# Patient Record
Sex: MALE | Race: WHITE | NOT HISPANIC OR LATINO | URBAN - METROPOLITAN AREA
[De-identification: names, ages, dates, MRNs, and addresses within clinical notes are randomized per-mention and may not be internally consistent; named-entity substitution may affect disease eponyms.]

---

## 2024-06-26 ENCOUNTER — HOSPITAL ENCOUNTER (OUTPATIENT)
Dept: RADIOLOGY | Facility: CLINIC | Age: 39
Discharge: HOME | End: 2024-06-26
Payer: COMMERCIAL

## 2024-06-26 ENCOUNTER — OFFICE VISIT (OUTPATIENT)
Dept: ORTHOPEDIC SURGERY | Facility: CLINIC | Age: 39
End: 2024-06-26
Payer: COMMERCIAL

## 2024-06-26 DIAGNOSIS — M79.672 LEFT FOOT PAIN: ICD-10-CM

## 2024-06-26 DIAGNOSIS — M76.829 POSTERIOR TIBIAL TENDON DYSFUNCTION: ICD-10-CM

## 2024-06-26 DIAGNOSIS — M72.2 PLANTAR FASCIITIS OF LEFT FOOT: Primary | ICD-10-CM

## 2024-06-26 PROCEDURE — 73630 X-RAY EXAM OF FOOT: CPT | Mod: LT

## 2024-06-26 PROCEDURE — 99204 OFFICE O/P NEW MOD 45 MIN: CPT | Performed by: PHYSICIAN ASSISTANT

## 2024-06-26 PROCEDURE — 99214 OFFICE O/P EST MOD 30 MIN: CPT | Performed by: PHYSICIAN ASSISTANT

## 2024-06-26 NOTE — PATIENT INSTRUCTIONS
The stretching program (see handout) should be done for about 10 minutes, three times a day.  You should stretch for 3 times a day for 6 weeks and then daily afterwards to maintain your flexibility.    [ ] If you received a plantar fascial night splint, then it should be worn for 6 weeks nightly and as needed after that period of time.  This is to minimize your stiffness and pain with the first few steps in the morning.    For additional relief, here are some more suggestions.  Lotions such as aspercreme that are available over the counter are helpful to rub in areas of heel pain up to three times daily.  You can also freeze a water bottle and roll the bottom of your foot over it.  Also, a heel cushion or heel cup that is available at local pharmacies or sports stores can be put in your shoe for extra cushioning.     [ ] Finally, if you were given a prescription for orthotics, then once the inserts are made, you can use the orthotics in your shoes to provide support and cushioning.    Please call and schedule an appointment to get fitted or molded for your custom made orthotics (see your prescription for phone number).  You need to bring your prescription with you to your appointment.  Insurance coverage for orthotics varies widely and you can contact your insurance company to find out whether orthotics are covered or not with your plan.  The orthotics company also can give you a cost estimate.     Once your custom made orthotics are made and ready to use, then take out your shoe's insert that it came with and put the orthotic in your shoe.  Orthotics generally work better in lace-up types of shoes (athletic shoes, clarks, keans, merrells, sketchers, etc.).  Sometimes, to accommodate your orthotic, you may have to wear a ½ to one size bigger shoe.  Your orthotics can be adjusted for comfort.  Your pedorthotist can help with adjustments after you wear your orthotics for an initial period of time.    Follow up as  needed

## 2024-07-10 NOTE — PROGRESS NOTES
"NPV-   History of Present Illness  38 y.o.male here for left foot pain  1. Plantar fasciitis of left foot        2. Left foot pain  XR foot left 3+ views      3. Posterior tibial tendon dysfunction          Mechanism of injury: none  Date of Injury/Pain: ~6 weeks  Location of pain: plantar heel  Frequency of Pain: worse with walking or standing; start up pain  Associated symptoms? Swelling.  Previous treatment?  Rest, otc inserts    27 point review of systems negative except what is stated in HPI    Constitutional Exam: patient's height and weight reviewed, well-kempt  Psychiatric Exam: alert and oriented x 3, appropriate mood and behavior  Eye Exam: HARRY, EOMI  Pulmonary Exam: breathing non-labored, no apparent distress  Lymphatic exam: no appreciable lymphadenopathy in the lower extremities  Cardiovascular exam: DP pulses 2+ bilaterally, PT 2+ bilaterally, toes are pink with good capillary refill, no pitting edema  Skin exam: no open lesions, rashes, abrasions or ulcerations  Neurological exam: sensation to light touch intact in both lower extremities in peripheral and dermatomal distributions (except for any abnormalities noted in musculoskeletal exam)    On examination of the left foot; patient is able to weight bear, no limping gait. pes planus alignment. No swelling, ecchymosis, erythema, lesions, ulcers or atrophy; Normal ROM at 2-5th toe flexion/extension and 1st MTP extension; 5/5 strength with great toe flexion/extension and resisted inversion/eversion); Achilles tightness. Tenderness to palpation over heel and plantar arch. No tenderness to palpation over the base of 1st metatarsal/2nd metatarsal, sesamoids, 5th metatarsal, medial cuneiform, navicular, 1st MTP joint or 2nd or 3rd intermetarsal space.  Neurovascularly intact.  Normal sensation to light touch. normal proprioception. Doralis pedis pulse and posterior tibial pulse 2+ b/l. Good capillary refill. negative foot squeeze test, positive \"too many " "toes sign\", negative calcaneal squeeze test    Contralateral foot: normal pes planus alignment     I personally reviewed the patient's x-ray images and reports of the left foot foot. The xrays show no fractures or dislocation.  Normal joint spacing    ASSESSMENT: left foot plantar fasciitis    PLAN: Treatment options were discussed with the patient. The patient can use a plantar night splint to wear to bed for 6 weeks. They can use a frozen water bottle or tennis ball to roll under the arch. They will avoid heels and flats and wear a good supportive shoe.  Patient was given a handout and instructed on an at home stretching program.  They should do these exercises 3 times per day for 6 weeks and then daily. Patient can use OTC aspercream for pain and continue to ice and elevate supported at the calf to reduce swelling. Patient is ambulatory and was given a prescription for orthotics, which are medically necessary due to the patient's medical condition and symptomatic pes planus.  All the patient's questions were answered. The patient agrees with the above plan.  Follow up as needed     "

## 2024-10-11 ENCOUNTER — HOSPITAL ENCOUNTER (OUTPATIENT)
Dept: RADIOLOGY | Facility: CLINIC | Age: 39
Discharge: HOME | End: 2024-10-11

## 2024-10-11 DIAGNOSIS — Z13.6 ENCOUNTER FOR SCREENING FOR CARDIOVASCULAR DISORDERS: ICD-10-CM

## 2024-10-11 PROCEDURE — 75571 CT HRT W/O DYE W/CA TEST: CPT

## 2025-08-13 ENCOUNTER — OFFICE VISIT (OUTPATIENT)
Dept: DERMATOLOGY | Facility: CLINIC | Age: 40
End: 2025-08-13
Payer: COMMERCIAL

## 2025-08-13 DIAGNOSIS — L70.0 ACNE VULGARIS: Primary | ICD-10-CM

## 2025-08-13 DIAGNOSIS — L73.9 FOLLICULITIS: ICD-10-CM

## 2025-08-13 PROCEDURE — 99204 OFFICE O/P NEW MOD 45 MIN: CPT | Performed by: DERMATOLOGY

## 2025-08-13 PROCEDURE — 1036F TOBACCO NON-USER: CPT | Performed by: DERMATOLOGY

## 2025-08-13 RX ORDER — CLINDAMYCIN PHOSPHATE 10 MG/ML
1 SOLUTION TOPICAL 2 TIMES DAILY
Qty: 69 EACH | Refills: 11 | Status: SHIPPED | OUTPATIENT
Start: 2025-08-13

## 2025-08-13 RX ORDER — DOXYCYCLINE 100 MG/1
CAPSULE ORAL
Qty: 14 CAPSULE | Refills: 3 | Status: SHIPPED | OUTPATIENT
Start: 2025-08-13

## 2025-08-13 RX ORDER — BENZOYL PEROXIDE 50 MG/ML
1 LIQUID TOPICAL DAILY
Qty: 141 G | Refills: 11 | Status: CANCELLED | OUTPATIENT
Start: 2025-08-13 | End: 2026-08-13

## 2025-08-13 RX ORDER — DOXYCYCLINE 100 MG/1
CAPSULE ORAL
Qty: 14 CAPSULE | Refills: 3 | Status: SHIPPED | OUTPATIENT
Start: 2025-08-13 | End: 2025-08-13

## 2025-08-13 RX ORDER — TRETINOIN 0.25 MG/G
CREAM TOPICAL NIGHTLY
Qty: 20 G | Refills: 3 | Status: SHIPPED | OUTPATIENT
Start: 2025-08-13 | End: 2026-08-13

## 2025-08-13 RX ORDER — CLINDAMYCIN PHOSPHATE 10 UG/ML
LOTION TOPICAL DAILY
Qty: 60 ML | Refills: 11 | Status: CANCELLED | OUTPATIENT
Start: 2025-08-13 | End: 2026-08-13

## 2025-08-13 ASSESSMENT — DERMATOLOGY QUALITY OF LIFE (QOL) ASSESSMENT
RATE HOW BOTHERED YOU ARE BY EFFECTS OF YOUR SKIN PROBLEMS ON YOUR ACTIVITIES (EG, GOING OUT, ACCOMPLISHING WHAT YOU WANT, WORK ACTIVITIES OR YOUR RELATIONSHIPS WITH OTHERS): 2
RATE HOW EMOTIONALLY BOTHERED YOU ARE BY YOUR SKIN PROBLEM (FOR EXAMPLE, WORRY, EMBARRASSMENT, FRUSTRATION): 2
WHAT SINGLE SKIN CONDITION LISTED BELOW IS THE PATIENT ANSWERING THE QUALITY-OF-LIFE ASSESSMENT QUESTIONS ABOUT: ACNE
ARE THERE EXCLUSIONS OR EXCEPTIONS FOR THE QUALITY OF LIFE ASSESSMENT: NO
RATE HOW BOTHERED YOU ARE BY SYMPTOMS OF YOUR SKIN PROBLEM (EG, ITCHING, STINGING BURNING, HURTING OR SKIN IRRITATION): 2

## 2025-08-13 ASSESSMENT — DERMATOLOGY PATIENT ASSESSMENT
DO YOU HAVE ANY NEW OR CHANGING LESIONS: YES
ARE YOU AN ORGAN TRANSPLANT RECIPIENT: NO
DO YOU USE A TANNING BED: NO
DO YOU USE SUNSCREEN: OCCASIONALLY

## 2025-08-13 ASSESSMENT — ITCH NUMERIC RATING SCALE: HOW SEVERE IS YOUR ITCHING?: 0

## 2025-08-13 ASSESSMENT — PATIENT GLOBAL ASSESSMENT (PGA): PATIENT GLOBAL ASSESSMENT: PATIENT GLOBAL ASSESSMENT:  2 - MILD

## 2026-08-07 ENCOUNTER — APPOINTMENT (OUTPATIENT)
Dept: DERMATOLOGY | Facility: CLINIC | Age: 41
End: 2026-08-07
Payer: COMMERCIAL